# Patient Record
Sex: FEMALE | URBAN - METROPOLITAN AREA
[De-identification: names, ages, dates, MRNs, and addresses within clinical notes are randomized per-mention and may not be internally consistent; named-entity substitution may affect disease eponyms.]

---

## 2022-03-31 ENCOUNTER — HOSPITAL ENCOUNTER (OUTPATIENT)
Age: 52
Setting detail: OBSERVATION
Discharge: HOME | End: 2022-04-01
Attending: EMERGENCY MEDICINE | Admitting: SURGERY
Payer: COMMERCIAL
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
## 2022-03-31 PROCEDURE — 96374 THER/PROPH/DIAG INJ IV PUSH: CPT

## 2022-03-31 PROCEDURE — 99284 EMERGENCY DEPT VISIT MOD MDM: CPT

## 2022-03-31 PROCEDURE — 6360000002 HC RX W HCPCS: Performed by: STUDENT IN AN ORGANIZED HEALTH CARE EDUCATION/TRAINING PROGRAM

## 2022-03-31 RX ORDER — FENTANYL CITRATE 50 UG/ML
INJECTION, SOLUTION INTRAMUSCULAR; INTRAVENOUS DAILY PRN
Status: COMPLETED | OUTPATIENT
Start: 2022-03-31 | End: 2022-03-31

## 2022-03-31 RX ORDER — FENTANYL CITRATE 50 UG/ML
INJECTION, SOLUTION INTRAMUSCULAR; INTRAVENOUS
Status: DISPENSED
Start: 2022-03-31 | End: 2022-04-01

## 2022-03-31 RX ADMIN — FENTANYL CITRATE 50 MCG: 50 INJECTION, SOLUTION INTRAMUSCULAR; INTRAVENOUS at 23:56

## 2022-03-31 NOTE — LETTER
04/01/22    Ashly Turner (1970)    A CT scan was performed during your stay in the hospital.  A radiologist reviewed these images and has encountered a finding that may be important to you and your primary care provider. The findings of your CT scan that may require further testing include:  1) lung nodules    A copy of your CT read has been printed for you. Please take the read and this letter to your primary care provider for further care.         Aon Streem

## 2022-04-01 ENCOUNTER — APPOINTMENT (OUTPATIENT)
Dept: CT IMAGING | Age: 52
End: 2022-04-01
Payer: COMMERCIAL

## 2022-04-01 ENCOUNTER — APPOINTMENT (OUTPATIENT)
Dept: GENERAL RADIOLOGY | Age: 52
End: 2022-04-01
Payer: COMMERCIAL

## 2022-04-01 ENCOUNTER — ANCILLARY PROCEDURE (OUTPATIENT)
Dept: EMERGENCY DEPT | Age: 52
End: 2022-04-01
Payer: COMMERCIAL

## 2022-04-01 VITALS
DIASTOLIC BLOOD PRESSURE: 86 MMHG | SYSTOLIC BLOOD PRESSURE: 141 MMHG | RESPIRATION RATE: 18 BRPM | HEART RATE: 86 BPM | TEMPERATURE: 98.6 F | OXYGEN SATURATION: 99 %
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
## 2022-04-01 PROBLEM — S42.001A FRACTURE OF RIGHT CLAVICLE: Status: ACTIVE | Noted: 2022-04-01

## 2022-04-01 LAB
ABO/RH: NORMAL
ABSOLUTE EOS #: 0.19 K/UL (ref 0–0.44)
ABSOLUTE IMMATURE GRANULOCYTE: 0.05 K/UL (ref 0–0.3)
ABSOLUTE LYMPH #: 2.07 K/UL (ref 1.1–3.7)
ABSOLUTE MONO #: 1.34 K/UL (ref 0.1–1.2)
ANION GAP SERPL CALCULATED.3IONS-SCNC: 10 MMOL/L (ref 9–17)
ANION GAP SERPL CALCULATED.3IONS-SCNC: 11 MMOL/L (ref 9–17)
ANTIBODY SCREEN: NEGATIVE
ARM BAND NUMBER: NORMAL
BASOPHILS # BLD: 0 % (ref 0–2)
BASOPHILS ABSOLUTE: 0.04 K/UL (ref 0–0.2)
BLOOD BANK SPECIMEN: ABNORMAL
BUN BLDV-MCNC: 17 MG/DL (ref 6–20)
BUN BLDV-MCNC: 17 MG/DL (ref 6–20)
CALCIUM SERPL-MCNC: 8.8 MG/DL (ref 8.6–10.4)
CARBOXYHEMOGLOBIN: 6.2 % (ref 0–5)
CHLORIDE BLD-SCNC: 105 MMOL/L (ref 98–107)
CHLORIDE BLD-SCNC: 108 MMOL/L (ref 98–107)
CO2: 23 MMOL/L (ref 20–31)
CO2: 24 MMOL/L (ref 20–31)
CREAT SERPL-MCNC: 0.86 MG/DL (ref 0.5–0.9)
CREAT SERPL-MCNC: 1.01 MG/DL (ref 0.5–0.9)
EOSINOPHILS RELATIVE PERCENT: 2 % (ref 1–4)
ETHANOL PERCENT: <0.01 %
ETHANOL: <10 MG/DL
EXPIRATION DATE: NORMAL
FIO2: ABNORMAL
GFR AFRICAN AMERICAN: >60 ML/MIN
GFR NON-AFRICAN AMERICAN: >60 ML/MIN
GFR SERPL CREATININE-BSD FRML MDRD: ABNORMAL ML/MIN/{1.73_M2}
GLUCOSE BLD-MCNC: 116 MG/DL (ref 70–99)
GLUCOSE BLD-MCNC: 212 MG/DL (ref 70–99)
HCG QUALITATIVE: NEGATIVE
HCO3 VENOUS: 25 MMOL/L (ref 24–30)
HCT VFR BLD CALC: 43.1 % (ref 36.3–47.1)
HCT VFR BLD CALC: 44 % (ref 36.3–47.1)
HEMOGLOBIN: 14 G/DL (ref 11.9–15.1)
HEMOGLOBIN: 14.3 G/DL (ref 11.9–15.1)
IMMATURE GRANULOCYTES: 1 %
INR BLD: 1
LYMPHOCYTES # BLD: 19 % (ref 24–43)
MCH RBC QN AUTO: 29.6 PG (ref 25.2–33.5)
MCH RBC QN AUTO: 29.8 PG (ref 25.2–33.5)
MCHC RBC AUTO-ENTMCNC: 31.8 G/DL (ref 28.4–34.8)
MCHC RBC AUTO-ENTMCNC: 33.2 G/DL (ref 28.4–34.8)
MCV RBC AUTO: 89.8 FL (ref 82.6–102.9)
MCV RBC AUTO: 93 FL (ref 82.6–102.9)
MONOCYTES # BLD: 12 % (ref 3–12)
NRBC AUTOMATED: 0 PER 100 WBC
NRBC AUTOMATED: 0 PER 100 WBC
O2 SAT, VEN: 98.4 % (ref 60–85)
PARTIAL THROMBOPLASTIN TIME: 20.4 SEC (ref 20.5–30.5)
PATIENT TEMP: 37
PCO2, VEN: 41.6 (ref 39–55)
PDW BLD-RTO: 14.3 % (ref 11.8–14.4)
PDW BLD-RTO: 14.5 % (ref 11.8–14.4)
PH VENOUS: 7.39 (ref 7.32–7.42)
PLATELET # BLD: 303 K/UL (ref 138–453)
PLATELET # BLD: 408 K/UL (ref 138–453)
PMV BLD AUTO: 9 FL (ref 8.1–13.5)
PMV BLD AUTO: 9 FL (ref 8.1–13.5)
PO2, VEN: 116 (ref 30–50)
POSITIVE BASE EXCESS, VEN: 0.5 MMOL/L (ref 0–2)
POTASSIUM SERPL-SCNC: 4.5 MMOL/L (ref 3.7–5.3)
POTASSIUM SERPL-SCNC: 5.3 MMOL/L (ref 3.7–5.3)
PROTHROMBIN TIME: 10.3 SEC (ref 9.1–12.3)
RBC # BLD: 4.73 M/UL (ref 3.95–5.11)
RBC # BLD: 4.8 M/UL (ref 3.95–5.11)
RBC # BLD: ABNORMAL 10*6/UL
SARS-COV-2, RAPID: NOT DETECTED
SEG NEUTROPHILS: 66 % (ref 36–65)
SEGMENTED NEUTROPHILS ABSOLUTE COUNT: 7.22 K/UL (ref 1.5–8.1)
SODIUM BLD-SCNC: 140 MMOL/L (ref 135–144)
SODIUM BLD-SCNC: 141 MMOL/L (ref 135–144)
SPECIMEN DESCRIPTION: NORMAL
VITAMIN D 25-HYDROXY: 16 NG/ML
WBC # BLD: 10.9 K/UL (ref 3.5–11.3)
WBC # BLD: 11 K/UL (ref 3.5–11.3)

## 2022-04-01 PROCEDURE — 85730 THROMBOPLASTIN TIME PARTIAL: CPT

## 2022-04-01 PROCEDURE — 86850 RBC ANTIBODY SCREEN: CPT

## 2022-04-01 PROCEDURE — 2580000003 HC RX 258: Performed by: STUDENT IN AN ORGANIZED HEALTH CARE EDUCATION/TRAINING PROGRAM

## 2022-04-01 PROCEDURE — 71045 X-RAY EXAM CHEST 1 VIEW: CPT

## 2022-04-01 PROCEDURE — 86900 BLOOD TYPING SEROLOGIC ABO: CPT

## 2022-04-01 PROCEDURE — 3209999900 CT THORACIC SPINE TRAUMA RECONSTRUCTION

## 2022-04-01 PROCEDURE — 82805 BLOOD GASES W/O2 SATURATION: CPT

## 2022-04-01 PROCEDURE — 6360000002 HC RX W HCPCS: Performed by: STUDENT IN AN ORGANIZED HEALTH CARE EDUCATION/TRAINING PROGRAM

## 2022-04-01 PROCEDURE — 82947 ASSAY GLUCOSE BLOOD QUANT: CPT

## 2022-04-01 PROCEDURE — 96361 HYDRATE IV INFUSION ADD-ON: CPT

## 2022-04-01 PROCEDURE — 97162 PT EVAL MOD COMPLEX 30 MIN: CPT

## 2022-04-01 PROCEDURE — 73030 X-RAY EXAM OF SHOULDER: CPT

## 2022-04-01 PROCEDURE — 3209999900 POC US FAST ABDOMEN LIMITED

## 2022-04-01 PROCEDURE — 96375 TX/PRO/DX INJ NEW DRUG ADDON: CPT

## 2022-04-01 PROCEDURE — 85027 COMPLETE CBC AUTOMATED: CPT

## 2022-04-01 PROCEDURE — 87635 SARS-COV-2 COVID-19 AMP PRB: CPT

## 2022-04-01 PROCEDURE — 84520 ASSAY OF UREA NITROGEN: CPT

## 2022-04-01 PROCEDURE — 73000 X-RAY EXAM OF COLLAR BONE: CPT

## 2022-04-01 PROCEDURE — 85610 PROTHROMBIN TIME: CPT

## 2022-04-01 PROCEDURE — 86901 BLOOD TYPING SEROLOGIC RH(D): CPT

## 2022-04-01 PROCEDURE — 97530 THERAPEUTIC ACTIVITIES: CPT

## 2022-04-01 PROCEDURE — 85025 COMPLETE CBC W/AUTO DIFF WBC: CPT

## 2022-04-01 PROCEDURE — 82565 ASSAY OF CREATININE: CPT

## 2022-04-01 PROCEDURE — 97535 SELF CARE MNGMENT TRAINING: CPT

## 2022-04-01 PROCEDURE — 36415 COLL VENOUS BLD VENIPUNCTURE: CPT

## 2022-04-01 PROCEDURE — 93005 ELECTROCARDIOGRAM TRACING: CPT | Performed by: EMERGENCY MEDICINE

## 2022-04-01 PROCEDURE — G0378 HOSPITAL OBSERVATION PER HR: HCPCS

## 2022-04-01 PROCEDURE — 84703 CHORIONIC GONADOTROPIN ASSAY: CPT

## 2022-04-01 PROCEDURE — 80048 BASIC METABOLIC PNL TOTAL CA: CPT

## 2022-04-01 PROCEDURE — 97166 OT EVAL MOD COMPLEX 45 MIN: CPT

## 2022-04-01 PROCEDURE — 80051 ELECTROLYTE PANEL: CPT

## 2022-04-01 PROCEDURE — 71250 CT THORAX DX C-: CPT

## 2022-04-01 PROCEDURE — 6370000000 HC RX 637 (ALT 250 FOR IP): Performed by: STUDENT IN AN ORGANIZED HEALTH CARE EDUCATION/TRAINING PROGRAM

## 2022-04-01 PROCEDURE — G0480 DRUG TEST DEF 1-7 CLASSES: HCPCS

## 2022-04-01 PROCEDURE — 82306 VITAMIN D 25 HYDROXY: CPT

## 2022-04-01 RX ORDER — OXYCODONE HYDROCHLORIDE 5 MG/1
5 TABLET ORAL EVERY 6 HOURS PRN
Status: DISCONTINUED | OUTPATIENT
Start: 2022-04-01 | End: 2022-04-01 | Stop reason: HOSPADM

## 2022-04-01 RX ORDER — SODIUM CHLORIDE 0.9 % (FLUSH) 0.9 %
5-40 SYRINGE (ML) INJECTION PRN
Status: DISCONTINUED | OUTPATIENT
Start: 2022-04-01 | End: 2022-04-01 | Stop reason: HOSPADM

## 2022-04-01 RX ORDER — CEPHALEXIN 500 MG/1
500 CAPSULE ORAL 2 TIMES DAILY
Qty: 14 CAPSULE | Refills: 0 | Status: SHIPPED | OUTPATIENT
Start: 2022-04-01 | End: 2022-04-08

## 2022-04-01 RX ORDER — ERGOCALCIFEROL 1.25 MG/1
50000 CAPSULE ORAL WEEKLY
Qty: 8 CAPSULE | Refills: 0 | Status: SHIPPED | OUTPATIENT
Start: 2022-04-01 | End: 2022-05-21

## 2022-04-01 RX ORDER — ACETAMINOPHEN 500 MG
1000 TABLET ORAL EVERY 8 HOURS SCHEDULED
Status: DISCONTINUED | OUTPATIENT
Start: 2022-04-01 | End: 2022-04-01 | Stop reason: HOSPADM

## 2022-04-01 RX ORDER — ACETAMINOPHEN 500 MG
1000 TABLET ORAL EVERY 8 HOURS SCHEDULED
Qty: 42 TABLET | Refills: 0 | Status: SHIPPED | OUTPATIENT
Start: 2022-04-01 | End: 2022-04-08

## 2022-04-01 RX ORDER — POLYETHYLENE GLYCOL 3350 17 G/17G
17 POWDER, FOR SOLUTION ORAL DAILY
Status: DISCONTINUED | OUTPATIENT
Start: 2022-04-01 | End: 2022-04-01 | Stop reason: HOSPADM

## 2022-04-01 RX ORDER — ONDANSETRON 4 MG/1
4 TABLET, ORALLY DISINTEGRATING ORAL EVERY 8 HOURS PRN
Status: DISCONTINUED | OUTPATIENT
Start: 2022-04-01 | End: 2022-04-01 | Stop reason: HOSPADM

## 2022-04-01 RX ORDER — IBUPROFEN 600 MG/1
600 TABLET ORAL EVERY 8 HOURS
Status: DISCONTINUED | OUTPATIENT
Start: 2022-04-01 | End: 2022-04-01 | Stop reason: HOSPADM

## 2022-04-01 RX ORDER — IBUPROFEN 400 MG/1
400 TABLET ORAL EVERY 4 HOURS
Qty: 18 TABLET | Refills: 0 | Status: SHIPPED | OUTPATIENT
Start: 2022-04-01 | End: 2022-04-04

## 2022-04-01 RX ORDER — OXYCODONE HYDROCHLORIDE 5 MG/1
5 TABLET ORAL EVERY 6 HOURS PRN
Qty: 10 TABLET | Refills: 0 | Status: SHIPPED | OUTPATIENT
Start: 2022-04-01 | End: 2022-04-08

## 2022-04-01 RX ORDER — ONDANSETRON 2 MG/ML
4 INJECTION INTRAMUSCULAR; INTRAVENOUS ONCE
Status: COMPLETED | OUTPATIENT
Start: 2022-04-01 | End: 2022-04-01

## 2022-04-01 RX ORDER — ONDANSETRON 2 MG/ML
4 INJECTION INTRAMUSCULAR; INTRAVENOUS EVERY 6 HOURS PRN
Status: DISCONTINUED | OUTPATIENT
Start: 2022-04-01 | End: 2022-04-01 | Stop reason: HOSPADM

## 2022-04-01 RX ORDER — 0.9 % SODIUM CHLORIDE 0.9 %
500 INTRAVENOUS SOLUTION INTRAVENOUS ONCE
Status: COMPLETED | OUTPATIENT
Start: 2022-04-01 | End: 2022-04-01

## 2022-04-01 RX ORDER — BISACODYL 10 MG
10 SUPPOSITORY, RECTAL RECTAL DAILY
Status: DISCONTINUED | OUTPATIENT
Start: 2022-04-01 | End: 2022-04-01 | Stop reason: HOSPADM

## 2022-04-01 RX ORDER — SENNA AND DOCUSATE SODIUM 50; 8.6 MG/1; MG/1
1 TABLET, FILM COATED ORAL 2 TIMES DAILY
Status: DISCONTINUED | OUTPATIENT
Start: 2022-04-01 | End: 2022-04-01 | Stop reason: HOSPADM

## 2022-04-01 RX ORDER — ERGOCALCIFEROL 1.25 MG/1
50000 CAPSULE ORAL WEEKLY
Qty: 8 CAPSULE | Refills: 0 | Status: SHIPPED | OUTPATIENT
Start: 2022-04-01 | End: 2022-04-01 | Stop reason: SDUPTHER

## 2022-04-01 RX ORDER — SODIUM CHLORIDE 0.9 % (FLUSH) 0.9 %
5-40 SYRINGE (ML) INJECTION EVERY 12 HOURS SCHEDULED
Status: DISCONTINUED | OUTPATIENT
Start: 2022-04-01 | End: 2022-04-01 | Stop reason: HOSPADM

## 2022-04-01 RX ORDER — FENTANYL CITRATE 50 UG/ML
25 INJECTION, SOLUTION INTRAMUSCULAR; INTRAVENOUS
Status: DISCONTINUED | OUTPATIENT
Start: 2022-04-01 | End: 2022-04-01

## 2022-04-01 RX ORDER — SENNA AND DOCUSATE SODIUM 50; 8.6 MG/1; MG/1
1 TABLET, FILM COATED ORAL 2 TIMES DAILY
Qty: 60 TABLET | Refills: 0 | Status: SHIPPED | OUTPATIENT
Start: 2022-04-01

## 2022-04-01 RX ORDER — SODIUM CHLORIDE 9 MG/ML
INJECTION, SOLUTION INTRAVENOUS PRN
Status: DISCONTINUED | OUTPATIENT
Start: 2022-04-01 | End: 2022-04-01 | Stop reason: HOSPADM

## 2022-04-01 RX ADMIN — ONDANSETRON 4 MG: 2 INJECTION INTRAMUSCULAR; INTRAVENOUS at 01:36

## 2022-04-01 RX ADMIN — WATER 2000 MG: 1 INJECTION INTRAMUSCULAR; INTRAVENOUS; SUBCUTANEOUS at 04:44

## 2022-04-01 RX ADMIN — ACETAMINOPHEN 1000 MG: 500 TABLET ORAL at 08:18

## 2022-04-01 RX ADMIN — SODIUM CHLORIDE, PRESERVATIVE FREE 10 ML: 5 INJECTION INTRAVENOUS at 09:33

## 2022-04-01 RX ADMIN — IBUPROFEN 600 MG: 600 TABLET, FILM COATED ORAL at 12:45

## 2022-04-01 RX ADMIN — IBUPROFEN 600 MG: 600 TABLET, FILM COATED ORAL at 04:43

## 2022-04-01 RX ADMIN — SODIUM CHLORIDE 500 ML: 9 INJECTION, SOLUTION INTRAVENOUS at 01:34

## 2022-04-01 ASSESSMENT — PAIN SCALES - GENERAL
PAINLEVEL_OUTOF10: 4
PAINLEVEL_OUTOF10: 4
PAINLEVEL_OUTOF10: 8
PAINLEVEL_OUTOF10: 5
PAINLEVEL_OUTOF10: 7
PAINLEVEL_OUTOF10: 5
PAINLEVEL_OUTOF10: 3
PAINLEVEL_OUTOF10: 4
PAINLEVEL_OUTOF10: 7
PAINLEVEL_OUTOF10: 8

## 2022-04-01 ASSESSMENT — PAIN DESCRIPTION - ORIENTATION
ORIENTATION: RIGHT

## 2022-04-01 ASSESSMENT — PAIN DESCRIPTION - LOCATION
LOCATION: SHOULDER

## 2022-04-01 ASSESSMENT — ENCOUNTER SYMPTOMS
ABDOMINAL PAIN: 0
FACIAL SWELLING: 0
SHORTNESS OF BREATH: 0
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
## 2022-04-01 NOTE — CARE COORDINATION
SBIRT-  Met with pt this date was alert and oriented  Pt denies any alcohol use, however states she uses marijuana a couple of times a week. Pt denies having any suicidal ideations or feelings of depression. Alcohol Screening and Brief Intervention        Recent Labs     04/01/22  0000   ALC <10       Alcohol Pre-screening     (WOMEN ONLY) How many times in the past year have you had 4 or more drinks in a day?: None    Alcohol Screening Audit       Drug Pre-Screening   How many times in the past year have you used a recreational drug or used a prescription medication for nonmedical reasons?: 1 or more    Drug Screening DAST  TOTAL SCORE[de-identified] 1    Mood Pre-Screening (PHQ-2)  During the past two weeks, have you been bothered by little interest or pleasure in doing things?: No  During the past two weeks, have you been bothered by feeling down, depressed, or hopeless?: No    Mood Pre-Screening (PHQ-9)         I have interviewed Rosamaria Massey, [id number] regarding  Her alcohol consumption/drug use and risk for excessive use. Screenings were positive. Patient  Declined intervention at this time.      Deferred []    Completed on: 4/1/2022   MAGGIE BHATIA
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
## 2022-04-01 NOTE — PROGRESS NOTES
Speech Language Pathology  [facility]  Speech Language Pathology    SPEECH/COGNITIVE ASSESSMENT    NO LOC,[facility] OR CVA/TIA - ST TO DEFER AT THIS TIME      Date: 4/1/2022  Patient Name: Suman Sharma  YOB: 1970   AGE: 46 y.o. MRN: [id number]        PT NOT SEEN FOR SPEECH OR COGNITIVE ASSESSMENT AT THIS TIME AS NO LOC, [facility]I OR CVA/TIA IS DOCUMENTED. ST TO DEFER AT THIS TIME. PLEASE RE-COSULT AS NEEDED.       Monique 25, SLP  4/1/2022  6:58 AM

## 2022-04-01 NOTE — PROGRESS NOTES
CLINICAL PHARMACY NOTE: MEDS TO BEDS    Total # of Prescriptions Filled: 5   The following medications were delivered to the patient:  · TYLENOL 500MG   · VIT D 1.25  · KEFLEX 500  · SENEXON   ·      Additional Documentation:    MEDS DELIVERED BY SHANNEN TO PT IN ROOM 246 AT 408PM NO COPAY
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
## 2022-04-01 NOTE — ED NOTES
Mid thoracic tenderness, but per Dr. Nazanin Lambert, No need for c-collar     Summer JANICE Ya  03/31/22 1601

## 2022-04-03 LAB
EKG ATRIAL RATE: 60 BPM
EKG P AXIS: 36 DEGREES
EKG P-R INTERVAL: 126 MS
EKG Q-T INTERVAL: 436 MS
EKG QRS DURATION: 72 MS
EKG QTC CALCULATION (BAZETT): 436 MS
EKG R AXIS: 67 DEGREES
EKG T AXIS: 45 DEGREES
EKG VENTRICULAR RATE: 60 BPM

## 2022-04-03 PROCEDURE — 93010 ELECTROCARDIOGRAM REPORT: CPT | Performed by: INTERNAL MEDICINE

## 2024-06-18 NOTE — PROGRESS NOTES
Patient instructed to remove shoes and socks and instructed to sit in exam chair.  Current PCP is Narda Bob MD and date of last visit was 01/31/2024.   Do you have a follow up visit scheduled?  No  If yes, the date is

## 2024-06-19 ENCOUNTER — OFFICE VISIT (OUTPATIENT)
Dept: PODIATRY | Age: 54
End: 2024-06-19
Payer: COMMERCIAL

## 2024-06-19 VITALS
DIASTOLIC BLOOD PRESSURE: 102 MMHG | HEIGHT: 62 IN | SYSTOLIC BLOOD PRESSURE: 148 MMHG | BODY MASS INDEX: 25.76 KG/M2 | WEIGHT: 140 LBS | HEART RATE: 104 BPM

## 2024-06-19 DIAGNOSIS — M62.461 GASTROCNEMIUS EQUINUS OF RIGHT LOWER EXTREMITY: ICD-10-CM

## 2024-06-19 DIAGNOSIS — G60.9 IDIOPATHIC NEUROPATHY: Primary | ICD-10-CM

## 2024-06-19 DIAGNOSIS — M62.462 GASTROCNEMIUS EQUINUS OF LEFT LOWER EXTREMITY: ICD-10-CM

## 2024-06-19 PROCEDURE — 3017F COLORECTAL CA SCREEN DOC REV: CPT

## 2024-06-19 PROCEDURE — 99203 OFFICE O/P NEW LOW 30 MIN: CPT

## 2024-06-19 PROCEDURE — G8419 CALC BMI OUT NRM PARAM NOF/U: HCPCS

## 2024-06-19 PROCEDURE — G8427 DOCREV CUR MEDS BY ELIG CLIN: HCPCS

## 2024-06-19 PROCEDURE — 4004F PT TOBACCO SCREEN RCVD TLK: CPT

## 2024-06-19 PROCEDURE — 99203 OFFICE O/P NEW LOW 30 MIN: CPT | Performed by: PODIATRIST

## 2024-06-19 RX ORDER — GABAPENTIN 100 MG/1
100 CAPSULE ORAL 2 TIMES DAILY
Qty: 60 CAPSULE | Refills: 0 | Status: SHIPPED | OUTPATIENT
Start: 2024-06-19 | End: 2024-07-19

## 2024-06-19 NOTE — PROGRESS NOTES
[facility]  [address]   [address] [place] [address]  Tel: [phone]   Fax: [phone]    Subjective     CC: Patient presents to clinic for left fourth and fifth digit pain    HPI:  Ashly Miller is a 53 y.o. year old female who presents to clinic today 10 out of 10 left fourth and fifth digit pain that seems to \"come and go \".  The patient states the pain suddenly increased 2 months ago with no causing factor.  Patient states the pain is relieved with naproxen, indomethacin, and a postop shoe.  Patient is also writhing and twitching.  Patient has a hyperactive disposition.  No other pedal complaints at this time.  Primary care physician is Narda Bob MD.    ROS:    Constitutional: Denies nausea, vomiting, fever, chills.  Neurologic: Denies numbness, tingling, and burning in the feet.    Vascular: Denies symptoms of lower extremity claudication.    Skin: Denies open wounds.  Otherwise negative except as noted in the HPI.     PMH:  No past medical history on file.  Patient states she has type II diabetic and has a last A1c of 5.7.  Patient does not know when her last A1c was taken.    Surgical History:   No past surgical history on file.    Social History:   40+ pack year history of smoking.    Medications:  Prior to Admission medications    Medication Sig Start Date End Date Taking? Authorizing Provider   diclofenac sodium (VOLTAREN) 1 % GEL Apply 2 g topically 4 times daily as needed for Pain 6/19/24  Yes Ezio Marley DPM   gabapentin (NEURONTIN) 100 MG capsule Take 1 capsule by mouth 2 times daily for 30 days. Intended supply: 30 days 6/19/24 7/19/24 Yes Ezio Marley DPM   predniSONE (DELTASONE) 10 MG tablet Take 4 tablets by mouth once daily for 5 days 6/11/24 6/21/24 Yes Amelia Brandon MD   naproxen (NAPROSYN) 500 MG tablet Take 1 tablet by mouth 2 times daily (with meals) 6/11/24  Yes Amelia Brandon MD   sennosides-docusate sodium (SENOKOT-S)

## 2024-10-05 ENCOUNTER — HOSPITAL ENCOUNTER (EMERGENCY)
Age: 54
Discharge: LEFT BEFORE DISCHARGE | End: 2024-10-05
Attending: EMERGENCY MEDICINE
Payer: COMMERCIAL

## 2024-10-05 ENCOUNTER — APPOINTMENT (OUTPATIENT)
Dept: GENERAL RADIOLOGY | Age: 54
End: 2024-10-05
Payer: COMMERCIAL

## 2024-10-05 VITALS
RESPIRATION RATE: 18 BRPM | SYSTOLIC BLOOD PRESSURE: 184 MMHG | BODY MASS INDEX: 23.92 KG/M2 | HEART RATE: 105 BPM | HEIGHT: 62 IN | DIASTOLIC BLOOD PRESSURE: 91 MMHG | OXYGEN SATURATION: 99 % | WEIGHT: 130 LBS | TEMPERATURE: 97.7 F

## 2024-10-05 DIAGNOSIS — M86.672 CHRONIC REFRACTORY OSTEOMYELITIS OF LEFT FOOT: ICD-10-CM

## 2024-10-05 DIAGNOSIS — M86.671 CHRONIC REFRACTORY OSTEOMYELITIS OF RIGHT FOOT: Primary | ICD-10-CM

## 2024-10-05 LAB
BASOPHILS # BLD: 0.03 K/UL (ref 0–0.2)
BASOPHILS NFR BLD: 0 % (ref 0–2)
CRP SERPL HS-MCNC: <3 MG/L (ref 0–5)
EOSINOPHIL # BLD: 0.31 K/UL (ref 0–0.44)
EOSINOPHILS RELATIVE PERCENT: 3 % (ref 1–4)
ERYTHROCYTE [DISTWIDTH] IN BLOOD BY AUTOMATED COUNT: 13.5 % (ref 11.8–14.4)
ERYTHROCYTE [SEDIMENTATION RATE] IN BLOOD BY PHOTOMETRIC METHOD: 24 MM/HR (ref 0–30)
HCT VFR BLD AUTO: 47.3 % (ref 36.3–47.1)
HGB BLD-MCNC: 15.2 G/DL (ref 11.9–15.1)
IMM GRANULOCYTES # BLD AUTO: 0.05 K/UL (ref 0–0.3)
IMM GRANULOCYTES NFR BLD: 1 %
LYMPHOCYTES NFR BLD: 1.99 K/UL (ref 1.1–3.7)
LYMPHOCYTES RELATIVE PERCENT: 22 % (ref 24–43)
MCH RBC QN AUTO: 29.3 PG (ref 25.2–33.5)
MCHC RBC AUTO-ENTMCNC: 32.1 G/DL (ref 28.4–34.8)
MCV RBC AUTO: 91.1 FL (ref 82.6–102.9)
MONOCYTES NFR BLD: 0.74 K/UL (ref 0.1–1.2)
MONOCYTES NFR BLD: 8 % (ref 3–12)
NEUTROPHILS NFR BLD: 66 % (ref 36–65)
NEUTS SEG NFR BLD: 6 K/UL (ref 1.5–8.1)
NRBC BLD-RTO: 0 PER 100 WBC
PLATELET # BLD AUTO: 353 K/UL (ref 138–453)
PMV BLD AUTO: 8.9 FL (ref 8.1–13.5)
RBC # BLD AUTO: 5.19 M/UL (ref 3.95–5.11)
WBC OTHER # BLD: 9.1 K/UL (ref 3.5–11.3)

## 2024-10-05 PROCEDURE — 85025 COMPLETE CBC W/AUTO DIFF WBC: CPT

## 2024-10-05 PROCEDURE — 85652 RBC SED RATE AUTOMATED: CPT

## 2024-10-05 PROCEDURE — 86140 C-REACTIVE PROTEIN: CPT

## 2024-10-05 PROCEDURE — 99284 EMERGENCY DEPT VISIT MOD MDM: CPT

## 2024-10-05 PROCEDURE — 73630 X-RAY EXAM OF FOOT: CPT

## 2024-10-05 ASSESSMENT — ENCOUNTER SYMPTOMS
CHEST TIGHTNESS: 0
EYE DISCHARGE: 0
EYE ITCHING: 0
CHOKING: 0
ANAL BLEEDING: 0
APNEA: 0
ABDOMINAL PAIN: 0
BLOOD IN STOOL: 0
FACIAL SWELLING: 0
DIARRHEA: 0
ABDOMINAL DISTENTION: 0
COUGH: 0

## 2024-10-05 ASSESSMENT — PAIN DESCRIPTION - DESCRIPTORS: DESCRIPTORS: BURNING

## 2024-10-05 ASSESSMENT — PAIN DESCRIPTION - ORIENTATION: ORIENTATION: LEFT

## 2024-10-05 ASSESSMENT — PAIN DESCRIPTION - LOCATION: LOCATION: TOE (COMMENT WHICH ONE)

## 2024-10-05 ASSESSMENT — PAIN SCALES - GENERAL: PAINLEVEL_OUTOF10: 6

## 2024-10-05 ASSESSMENT — LIFESTYLE VARIABLES
HOW MANY STANDARD DRINKS CONTAINING ALCOHOL DO YOU HAVE ON A TYPICAL DAY: PATIENT DOES NOT DRINK
HOW OFTEN DO YOU HAVE A DRINK CONTAINING ALCOHOL: NEVER

## 2024-10-05 ASSESSMENT — PAIN - FUNCTIONAL ASSESSMENT: PAIN_FUNCTIONAL_ASSESSMENT: 0-10

## 2024-10-05 NOTE — ED PROVIDER NOTES
[facility] ED  Emergency Department Encounter  Emergency Medicine Resident     Pt Name:Ashly Miller  MRN: [id number]  Birthdate 1970  Date of evaluation: 10/5/24  PCP:  Narda Bob MD  Note Started: 7:19 PM EDT      CHIEF COMPLAINT       Chief Complaint   Patient presents with    Toe Pain     Left, little toe       HISTORY OF PRESENT ILLNESS  (Location/Symptom, Timing/Onset, Context/Setting, Quality, Duration, Modifying Factors, Severity.)      Ashly Miller is a 53 y.o. female with a past medical history of type 2 diabetes, peripheral pulmonary neuropathy, and osteomyelitis presented with increasing pain.  Per outside chart patient currently on clindamycin 150 mg capsule 3 times per day for 10 days and she failed previous Bactrim, Doxy, Levaquin with rifampin therapy.  Her toes are dark in color, no current pus however patient mentioned that her toes are draining pus at home.  Her pain is 8 out of 10 when she bears weight on her feet or on touch.  Her sensation decreased in her lateral left feet.  Patient denied any fever.    PAST MEDICAL / SURGICAL / SOCIAL / FAMILY HISTORY      has no past medical history on file.  Anterior cruciate ligament disruption   left - here for workup for pending surgical repair   Anxiety   Arthritis   Asthma   Bipolar disorder (CMS-HCC)   Blue toe syndrome (CMS-HCC) 2022   COPD (chronic obstructive pulmonary disease) (CMS-HCC)   Dental disease   denture on top   Depression   Diabetes mellitus type 2, controlled (CMS-HCC)   TEJADA (dyspnea on exertion)   Fibromyalgia, primary   GERD (gastroesophageal reflux disease)   Headache   Hyperlipidemia   Hypertension   MRSA (methicillin resistant Staphylococcus aureus)   Obesity   BMI 31.8   PAD (peripheral artery disease) (CMS-HCC)   right leg   Schizoaffective disorder (CMS-HCC)   Visual impairment   wears glasses      has no past surgical history on file.  APPENDECTOMY   ARTHROSCOPY KNEE /  ANTERIOR CRUCIATE LIGAMENT RECONSTRUCTION / PARTIAL MEDICAL MENISCTECTOMY Left 11/9/2022   Performed by Juan Lemus MD at [facility]   ARTHROSCOPY REVISION ANTERIOR CRUCIATE LIGAMENT KNEE WITH PARTIAL LATERAL MENISECTOMY Left 9/18/2023   Performed by Pierre Lemus MD at [facility]   CARPAL TUNNEL RELEASE Right   FRACTURE SURGERY   right wrist   REMOVAL HARDWARE KNEE Left 9/18/2023   Performed by Pierre Lemus MD at [facility]   TONSILLECTOMY   TUBAL LIGATION     Social History     Socioeconomic History    Marital status:      Spouse name: Not on file    Number of children: Not on file    Years of education: Not on file    Highest education level: Not on file   Occupational History    Not on file   Tobacco Use    Smoking status: Not on file    Smokeless tobacco: Not on file   Substance and Sexual Activity    Alcohol use: Not on file    Drug use: Not on file    Sexual activity: Not on file   Other Topics Concern    Not on file   Social History Narrative    Not on file     Social Determinants of Health     Financial Resource Strain: High Risk (6/1/2023)    Received from [employer], [facility]    Overall Financial Resource Strain (CARDIA)     Difficulty of Paying Living Expenses: Hard   Food Insecurity: No Food Insecurity (9/8/2024)    [facility]    Hunger Screening     Within the past 12 months we worried whether our food would run out before we got money to buy more.: Never True     Within the past 12 months the food we bought just didn't last and we didn't have money to get more.: Never True   Transportation Needs: No Transportation Needs (6/1/2023)    [facility] [employer], [facility]    PRAPARE - Transportation     Lack of Transportation (Medical): No     Lack of Transportation (Non-Medical): No   Physical Activity: Not on file   Stress: Not on file   Social Connections: Not on

## 2024-10-05 NOTE — ED TRIAGE NOTES
Pt arrived to ED with reports of toe pain to the left little toe. Pt does have an upcoming appointment with a podiatrist.     VSS, RR equal and non labored, NAD, pt is alert and oriented x4    Call light within reach,     Following plan of care
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
## 2024-10-05 NOTE — ED PROVIDER NOTES
[facility]     Emergency Department     Faculty Attestation    I performed a history and physical examination of the patient and discussed management with the resident. I reviewed the resident's note and agree with the documented findings and plan of care. Any areas of disagreement are noted on the chart. I was personally present for the key portions of any procedures. I have documented in the chart those procedures where I was not present during the key portions. I have reviewed the emergency nurses triage note. I agree with the chief complaint, past medical history, past surgical history, allergies, medications, social and family history as documented unless otherwise noted below. For Physician Assistant/ Nurse Practitioner cases/documentation I have personally evaluated this patient and have completed at least one if not all key elements of the E/M (history, physical exam, and MDM). Additional findings are as noted.    Dusky left little toe, patient has history of osteomyelitis.         Edwar Cooney MD  10/05/24 [id number]

## 2024-10-06 NOTE — ED NOTES
Patient's belongings no longer in pt room when writer went to go update vitals. Patient appears to have eloped. ED resident notified.

## 2024-10-06 NOTE — ED NOTES
Writer inquired about patient's return to ED room with triage staff. States patient just returned to room a few minutes ago. Patient not in room on writer's rounding.

## 2025-07-29 ENCOUNTER — HOSPITAL ENCOUNTER (EMERGENCY)
Age: 55
Discharge: HOME | End: 2025-07-29
Attending: EMERGENCY MEDICINE
Payer: COMMERCIAL

## 2025-07-29 ENCOUNTER — APPOINTMENT (OUTPATIENT)
Dept: GENERAL RADIOLOGY | Age: 55
End: 2025-07-29
Payer: COMMERCIAL

## 2025-07-29 VITALS
RESPIRATION RATE: 16 BRPM | OXYGEN SATURATION: 94 % | DIASTOLIC BLOOD PRESSURE: 86 MMHG | SYSTOLIC BLOOD PRESSURE: 127 MMHG | HEART RATE: 96 BPM | TEMPERATURE: 97.7 F

## 2025-07-29 DIAGNOSIS — R05.2 SUBACUTE COUGH: Primary | ICD-10-CM

## 2025-07-29 LAB
ALBUMIN SERPL-MCNC: 4.2 G/DL (ref 3.5–5.2)
ALBUMIN/GLOB SERPL: 1.4 {RATIO} (ref 1–2.5)
ALP SERPL-CCNC: 73 U/L (ref 35–104)
ALT SERPL-CCNC: 24 U/L (ref 10–35)
ANION GAP SERPL CALCULATED.3IONS-SCNC: 15 MMOL/L (ref 9–16)
AST SERPL-CCNC: 28 U/L (ref 10–35)
BASOPHILS # BLD: 0.04 K/UL (ref 0–0.2)
BASOPHILS NFR BLD: 1 % (ref 0–2)
BILIRUB SERPL-MCNC: 0.2 MG/DL (ref 0–1.2)
BILIRUB UR QL STRIP: NEGATIVE
BUN SERPL-MCNC: 12 MG/DL (ref 6–20)
CALCIUM SERPL-MCNC: 9.4 MG/DL (ref 8.6–10.4)
CHLORIDE SERPL-SCNC: 104 MMOL/L (ref 98–107)
CLARITY UR: CLEAR
CO2 SERPL-SCNC: 20 MMOL/L (ref 20–31)
COLOR UR: YELLOW
COMMENT: ABNORMAL
CREAT SERPL-MCNC: 1 MG/DL (ref 0.6–0.9)
EOSINOPHIL # BLD: 0.16 K/UL (ref 0–0.44)
EOSINOPHILS RELATIVE PERCENT: 2 % (ref 1–4)
ERYTHROCYTE [DISTWIDTH] IN BLOOD BY AUTOMATED COUNT: 13.5 % (ref 11.8–14.4)
GFR, ESTIMATED: 66 ML/MIN/1.73M2
GLUCOSE SERPL-MCNC: 103 MG/DL (ref 74–99)
GLUCOSE UR STRIP-MCNC: NEGATIVE MG/DL
HCT VFR BLD AUTO: 48.5 % (ref 36.3–47.1)
HGB BLD-MCNC: 15.8 G/DL (ref 11.9–15.1)
HGB UR QL STRIP.AUTO: NEGATIVE
IMM GRANULOCYTES # BLD AUTO: 0.03 K/UL (ref 0–0.3)
IMM GRANULOCYTES NFR BLD: 0 %
KETONES UR STRIP-MCNC: ABNORMAL MG/DL
LEUKOCYTE ESTERASE UR QL STRIP: NEGATIVE
LYMPHOCYTES NFR BLD: 1.44 K/UL (ref 1.1–3.7)
LYMPHOCYTES RELATIVE PERCENT: 18 % (ref 24–43)
MAGNESIUM SERPL-MCNC: 2 MG/DL (ref 1.6–2.6)
MCH RBC QN AUTO: 30 PG (ref 25.2–33.5)
MCHC RBC AUTO-ENTMCNC: 32.6 G/DL (ref 28.4–34.8)
MCV RBC AUTO: 92 FL (ref 82.6–102.9)
MONOCYTES NFR BLD: 0.8 K/UL (ref 0.1–1.2)
MONOCYTES NFR BLD: 10 % (ref 3–12)
NEUTROPHILS NFR BLD: 69 % (ref 36–65)
NEUTS SEG NFR BLD: 5.43 K/UL (ref 1.5–8.1)
NITRITE UR QL STRIP: NEGATIVE
NRBC BLD-RTO: 0 PER 100 WBC
PH UR STRIP: 6 [PH] (ref 5–8)
PLATELET # BLD AUTO: 357 K/UL (ref 138–453)
PMV BLD AUTO: 9.4 FL (ref 8.1–13.5)
POTASSIUM SERPL-SCNC: 4.1 MMOL/L (ref 3.7–5.3)
PROT SERPL-MCNC: 7.1 G/DL (ref 6.6–8.7)
PROT UR STRIP-MCNC: NEGATIVE MG/DL
RBC # BLD AUTO: 5.27 M/UL (ref 3.95–5.11)
SODIUM SERPL-SCNC: 139 MMOL/L (ref 136–145)
SP GR UR STRIP: 1.02 (ref 1–1.03)
TROPONIN I SERPL HS-MCNC: 14 NG/L (ref 0–14)
UROBILINOGEN UR STRIP-ACNC: NORMAL EU/DL (ref 0–1)
WBC OTHER # BLD: 7.9 K/UL (ref 3.5–11.3)

## 2025-07-29 PROCEDURE — 85025 COMPLETE CBC W/AUTO DIFF WBC: CPT

## 2025-07-29 PROCEDURE — 6370000000 HC RX 637 (ALT 250 FOR IP): Performed by: STUDENT IN AN ORGANIZED HEALTH CARE EDUCATION/TRAINING PROGRAM

## 2025-07-29 PROCEDURE — 83735 ASSAY OF MAGNESIUM: CPT

## 2025-07-29 PROCEDURE — 81003 URINALYSIS AUTO W/O SCOPE: CPT

## 2025-07-29 PROCEDURE — 93005 ELECTROCARDIOGRAM TRACING: CPT | Performed by: STUDENT IN AN ORGANIZED HEALTH CARE EDUCATION/TRAINING PROGRAM

## 2025-07-29 PROCEDURE — 94761 N-INVAS EAR/PLS OXIMETRY MLT: CPT

## 2025-07-29 PROCEDURE — 71046 X-RAY EXAM CHEST 2 VIEWS: CPT

## 2025-07-29 PROCEDURE — 84484 ASSAY OF TROPONIN QUANT: CPT

## 2025-07-29 PROCEDURE — 94640 AIRWAY INHALATION TREATMENT: CPT

## 2025-07-29 PROCEDURE — 99285 EMERGENCY DEPT VISIT HI MDM: CPT | Performed by: EMERGENCY MEDICINE

## 2025-07-29 PROCEDURE — 2580000003 HC RX 258: Performed by: STUDENT IN AN ORGANIZED HEALTH CARE EDUCATION/TRAINING PROGRAM

## 2025-07-29 PROCEDURE — 96360 HYDRATION IV INFUSION INIT: CPT | Performed by: EMERGENCY MEDICINE

## 2025-07-29 PROCEDURE — 80053 COMPREHEN METABOLIC PANEL: CPT

## 2025-07-29 PROCEDURE — 96361 HYDRATE IV INFUSION ADD-ON: CPT | Performed by: EMERGENCY MEDICINE

## 2025-07-29 RX ORDER — MOMETASONE FUROATE AND FORMOTEROL FUMARATE DIHYDRATE 200; 5 UG/1; UG/1
2 AEROSOL RESPIRATORY (INHALATION) 2 TIMES DAILY
Qty: 13 G | Refills: 0 | Status: SHIPPED | OUTPATIENT
Start: 2025-07-29 | End: 2025-08-20

## 2025-07-29 RX ORDER — ALBUTEROL SULFATE 5 MG/ML
15 SOLUTION RESPIRATORY (INHALATION)
Status: DISCONTINUED | OUTPATIENT
Start: 2025-07-29 | End: 2025-07-29 | Stop reason: HOSPADM

## 2025-07-29 RX ORDER — MOMETASONE FUROATE AND FORMOTEROL FUMARATE DIHYDRATE 200; 5 UG/1; UG/1
2 AEROSOL RESPIRATORY (INHALATION) 2 TIMES DAILY
Qty: 13 G | Refills: 0 | Status: SHIPPED | OUTPATIENT
Start: 2025-07-29 | End: 2025-07-29

## 2025-07-29 RX ORDER — ALBUTEROL SULFATE 0.83 MG/ML
2.5 SOLUTION RESPIRATORY (INHALATION) EVERY 6 HOURS PRN
Qty: 120 EACH | Refills: 0 | Status: SHIPPED | OUTPATIENT
Start: 2025-07-29 | End: 2025-07-29

## 2025-07-29 RX ORDER — ALBUTEROL SULFATE 90 UG/1
2 INHALANT RESPIRATORY (INHALATION) 4 TIMES DAILY PRN
Qty: 18 G | Refills: 0 | Status: SHIPPED | OUTPATIENT
Start: 2025-07-29

## 2025-07-29 RX ORDER — 0.9 % SODIUM CHLORIDE 0.9 %
500 INTRAVENOUS SOLUTION INTRAVENOUS ONCE
Status: COMPLETED | OUTPATIENT
Start: 2025-07-29 | End: 2025-07-29

## 2025-07-29 RX ORDER — ALBUTEROL SULFATE 90 UG/1
2 INHALANT RESPIRATORY (INHALATION) 4 TIMES DAILY PRN
Qty: 18 G | Refills: 0 | Status: SHIPPED | OUTPATIENT
Start: 2025-07-29 | End: 2025-07-29

## 2025-07-29 RX ORDER — MOMETASONE FUROATE AND FORMOTEROL FUMARATE DIHYDRATE 200; 5 UG/1; UG/1
2 AEROSOL RESPIRATORY (INHALATION) 2 TIMES DAILY
COMMUNITY
Start: 2025-07-07 | End: 2025-07-29

## 2025-07-29 RX ORDER — ALBUTEROL SULFATE 0.83 MG/ML
2.5 SOLUTION RESPIRATORY (INHALATION)
Status: DISCONTINUED | OUTPATIENT
Start: 2025-07-29 | End: 2025-07-29 | Stop reason: HOSPADM

## 2025-07-29 RX ORDER — IPRATROPIUM BROMIDE AND ALBUTEROL SULFATE 2.5; .5 MG/3ML; MG/3ML
1 SOLUTION RESPIRATORY (INHALATION)
Status: DISCONTINUED | OUTPATIENT
Start: 2025-07-29 | End: 2025-07-29 | Stop reason: HOSPADM

## 2025-07-29 RX ORDER — ALBUTEROL SULFATE 0.83 MG/ML
2.5 SOLUTION RESPIRATORY (INHALATION) EVERY 6 HOURS PRN
Qty: 120 EACH | Refills: 0 | Status: SHIPPED | OUTPATIENT
Start: 2025-07-29

## 2025-07-29 RX ADMIN — SODIUM CHLORIDE 500 ML: 0.9 INJECTION, SOLUTION INTRAVENOUS at 14:40

## 2025-07-29 RX ADMIN — IPRATROPIUM BROMIDE AND ALBUTEROL SULFATE 1 DOSE: .5; 2.5 SOLUTION RESPIRATORY (INHALATION) at 15:59

## 2025-07-29 ASSESSMENT — PAIN - FUNCTIONAL ASSESSMENT: PAIN_FUNCTIONAL_ASSESSMENT: 0-10

## 2025-07-29 ASSESSMENT — PAIN SCALES - GENERAL: PAINLEVEL_OUTOF10: 4

## 2025-07-29 NOTE — DISCHARGE INSTRUCTIONS
You were seen today emergency department for shortness of breath and chest pain.  Your workup was negative for pneumonia, or cardiac causes of your symptoms at this time, you are cleared to discharge.  As we discussed, if your urinary symptoms return, please return for reevaluation for potential urinary tract infection.  Please continue taking your Flagyl until you have completed this course as it will help reduce resistance from your current bacterial infection.

## 2025-07-29 NOTE — ED PROVIDER NOTES
[facility]     Emergency Department     Faculty Attestation    I performed a history and physical examination of the patient and discussed management with the resident. I reviewed the resident’s note and agree with the documented findings and plan of care. Any areas of disagreement are noted on the chart. I was personally present for the key portions of any procedures. I have documented in the chart those procedures where I was not present during the key portions. I have reviewed the emergency nurses triage note. I agree with the chief complaint, past medical history, past surgical history, allergies, medications, social and family history as documented unless otherwise noted below.   For Physician Assistant/ Nurse Practitioner cases/documentation I have personally evaluated this patient and have completed at least one if not all key elements of the E/M (history, physical exam, and MDM). Additional findings are as noted.      Primary Care Physician:  Narda Bob MD    CHIEF COMPLAINT       Chief Complaint   Patient presents with    Cough    Shortness of Breath    Muscle Pain       RECENT VITALS:   Temp: 97.7 °F (36.5 °C),  Pulse: (!) 107, Respirations: 16, BP: 127/86    LABS:  Labs Reviewed   CBC WITH AUTO DIFFERENTIAL - Abnormal; Notable for the following components:       Result Value    RBC 5.27 (*)     Hemoglobin 15.8 (*)     Hematocrit 48.5 (*)     Neutrophils % 69 (*)     Lymphocytes % 18 (*)     All other components within normal limits   MAGNESIUM   COMPREHENSIVE METABOLIC PANEL   TROPONIN         PERTINENT ATTENDING PHYSICIAN COMMENTS:    Patient is here with a cough and some shortness of breath also some charley horses in her arms she is currently in rehab from drug abuse there has been no fevers chills  Will do a cardiac workup  Critical Care          Homero Sheriff MD,  MD, FAAEM  Attending Emergency  Physician              Homero Sheriff MD  07/29/25

## 2025-07-29 NOTE — ED PROVIDER NOTES
Note Started: 4:28 PM EDT     Faculty Sign-Out Attestation  Handoff taken on the following patient from prior Attending Physician at 1600: Jaziel    I was available and discussed any additional care issues that arose and coordinated the management plans with the resident(s) caring for the patient during my duty period. Any areas of disagreement with resident’s documentation of care or procedures are noted on the chart. I was personally present for the key portions of any/all procedures during my duty period. I have documented in the chart those procedures where I was not present during the key portions.    54-year-old female presenting with cough and shortness of breath.  Improved with albuterol/Atrovent, chest x-ray negative for pneumonia, no significant lab abnormalities.  Plan to discharge with symptomatic care    Billie Mathias MD  Attending Physician        Billie Mathias MD  07/29/25 [id number]

## 2025-07-29 NOTE — PROGRESS NOTES
BRONCHOSPASM/BRONCHOCONSTRICTION     [x]         IMPROVE AERATION/BREATH SOUNDS  [x]   ADMINISTER BRONCHODILATOR THERAPY AS APPROPRIATE  [x]   ASSESS BREATH SOUNDS  [x]   IMPLEMENT AEROSOL/MDI PROTOCOL  [x]   PATIENT EDUCATION AS NEEDED     Assistance OOB with selected safe patient handling equipment if applicable/Assistance with ambulation/Communicate risk of Fall with Harm to all staff, patient, and family/Monitor gait and stability/Monitor for mental status changes and reorient to person, place, and time, as needed/Provide patient with walking aids/Provide visual cue: red socks, yellow wristband, yellow gown, etc/Reinforce activity limits and safety measures with patient and family/Toileting schedule using arm’s reach rule for commode and bathroom/Use of alarms - bed, stretcher, chair and/or video monitoring/Bed in lowest position, wheels locked, appropriate side rails in place/Call bell, personal items and telephone in reach/Instruct patient to call for assistance before getting out of bed/chair/stretcher/Non-slip footwear applied when patient is off stretcher/[place] to call system/Physically safe environment - no spills, clutter or unnecessary equipment/Purposeful Proactive Rounding/Room/bathroom lighting operational, light cord in reach

## 2025-07-29 NOTE — ED PROVIDER NOTES
[facility] EMERGENCY DEPARTMENT  Emergency Department Encounter  Emergency Medicine Resident     Pt Name:Ashly Miller  MRN: [id number]  Birthdate 1970  Date of evaluation: 7/29/25  PCP:  Narda Bob MD  Note Started: 12:58 PM EDT      CHIEF COMPLAINT       Chief Complaint   Patient presents with    Cough    Shortness of Breath    Muscle Pain       HISTORY OF PRESENT ILLNESS  (Location/Symptom, Timing/Onset, Context/Setting, Quality, Duration, Modifying Factors, Severity.)      Ashly Miller is a 54 y.o. female who presents with persistent shortness of breath over the past couple of days.  Patient reports that she has COPD, does not use oxygen at home, but is currently in inpatient rehab at [facility].  Patient has not had access to her inhaler since then, but typically will use her rescue inhaler and her scheduled inhaler multiple times a day.  Patient denies fevers, is being treated for bacterial vaginosis and has been taking her Flagyl as prescribed.  Mentions intermittent dysuria, though she is unsure if this is secondary to the BV.  Patient denies nausea, vomiting, fevers, headaches, vision changes.    PAST MEDICAL / SURGICAL / SOCIAL / FAMILY HISTORY      has no past medical history on file.     has no past surgical history on file.    Social History     Socioeconomic History    Marital status:      Spouse name: Not on file    Number of children: Not on file    Years of education: Not on file    Highest education level: Not on file   Occupational History    Not on file   Tobacco Use    Smoking status: Not on file    Smokeless tobacco: Not on file   Substance and Sexual Activity    Alcohol use: Not on file    Drug use: Not on file    Sexual activity: Not on file   Other Topics Concern    Not on file   Social History Narrative    Not on file     Social Drivers of Health     Financial Resource Strain: Low Risk  (10/12/2024)    Received from [facility]

## 2025-07-31 LAB
EKG ATRIAL RATE: 117 BPM
EKG P AXIS: 58 DEGREES
EKG P-R INTERVAL: 134 MS
EKG Q-T INTERVAL: 302 MS
EKG QRS DURATION: 64 MS
EKG QTC CALCULATION (BAZETT): 421 MS
EKG R AXIS: 67 DEGREES
EKG T AXIS: 64 DEGREES
EKG VENTRICULAR RATE: 117 BPM